# Patient Record
Sex: MALE | Race: WHITE
[De-identification: names, ages, dates, MRNs, and addresses within clinical notes are randomized per-mention and may not be internally consistent; named-entity substitution may affect disease eponyms.]

---

## 2018-08-07 NOTE — CONS
CONSULTATION



REASON FOR CONSULTATION:

This is a consultation note for sleep apnea.



PRIMARY CARE PHYSICIAN:

Dr. Jessica Hester



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

pm.  He goes to bed around midnight and he wakes up 10 o'clock in the morning.  He

snores. He was been told to stop breathing.  He lives with his brother and he has been

noted to stop breathing on multiple occasions.  His aunt has obstructive sleep apnea

and she has noted his breathing patterns during sleep and the patient was referred to

me for sleep apnea evaluation especially that he is having excessive fatigue and

tiredness and sleepiness during the day.  No falling asleep while driving.  Bagdad

score is at 5.  Weight stays stable.  He is excessively tired and sleepy during the

day.  No history of alcohol.  No history of substance abuse.  He has undergone

tonsillectomy in the past.  He has history of depression, maintained on Prozac.  No

other comorbidities otherwise.



PAST MEDICAL HISTORY:

Obesity and depression.



PAST SURGICAL HISTORY:

Tonsillectomy.



DRUG ALLERGIES:

Not known.



OUTPATIENT MEDICATIONS:

Prozac.



SOCIAL HISTORY:

He is a nonsmoker.  No history of alcohol.  No history of IV drugs.  He does not drink

coffee other than 2 cups a day.  No history of any alcohol or illicit substance abuse.

No history of intake of caffeinated beverages or any other energy drinks.



FAMILY HISTORY:

Aunt has obstructive sleep apnea.



REVIEW OF SYSTEMS:

12-point review of system was done.  Positive findings are mentioned in history of

present illness.  No sleepwalking or sleep talking.  No cataplexy.  No sleep paralysis.

No hallucinations.  No active anxiety.  No palpitations.  No anxiety.  No panic

attacks.  No heartburn during sleep.  No restlessness in lower extremities.  No sleep

talking. He has been told to wake up gasping for air.  Although he himself has not

noted that.



PHYSICAL EXAMINATION:

BP is 145/79, pulse 79, respirations 16, temperature 16, temp 97.7, weight is 259,

height is 5 feet 8 inches, Bagdad score is 5.  BMI 39.3.  Neck size 17-3/4 of an inch.

Saturation 97% on room air.  BP is 145/79.  General appearance: Calm, comfortable.

Head is atraumatic, normocephalic.  Neck Mallampati class IV.  There is no goiter or

neck masses.

LUNGS:  Clear to auscultation.

HEART:  Sounds regular rate and rhythm.  Normal S1, S2.  No S3.  No S4, no murmurs.

ABDOMEN:  Soft, nontender.

EXTREMITIES:  No edema.  No cyanosis or clubbing.

NEUROLOGIC:  Alert and oriented x3.  No focal neurological deficits.

PSYCHIATRIC:  Negative for anxiety or depression.



IMPRESSION:

1. Excessive hypersomnia and sleepiness with symptoms of snoring and witnessed apneas.

    Rule out underlying obstructive sleep apnea.

2. Obese with a BMI of 39.3.

3. Previous tonsillectomy.

4. History of depression.



PLAN:

1. Encourage weight loss.

2. Proceed with a screening polysomnogram looking for any significant obstructive

    sleep apnea.  There is a high suspicion that the patient may have an underlying

    sleep breathing disorder.





CESAR / ONIN: [id number] / Job#: [id number]

## 2019-01-15 NOTE — PN
PROGRESS NOTE



Samuel is a 23, coming to see me for a compliance check.  The patient was diagnosed

having mild obstructive sleep apnea and currently is on CPAP.  In APAP mode minimum of

5, maximum of 20.  He has a mild KIRK with an AHI of 11.4, worse during REM.  He is

benefitting considerably while on CPAP.  He is able to work, being much more alert and

awake and does not have to fall asleep while working or performing routine day-to-day

activities.  He has been very compliant with CPAP.  He is wearing every night.  He is

averaging 6.8 hours and his leak factor is 12 L/minute.  The average pressure is around

10.6 while on APAP therapy and his AHI is down to 1.5.  Weight is up by around 4-5

pounds.  No snoring and his snoring has completely subside as the patient is using

AirFit F20 medium-sized full-face mask.



REVIEW OF SYSTEMS:

12-point review of system was done.  Positive findings are mentioned in history of

present illness.



PHYSICAL EXAMINATION:

VITAL SIGNS: His current vitals, pressure is 149/76, pulse 78, respirations 16,

temperature 98.1, weight is 264.  Saturation 97% on room air.  Upton score is at 3.

GENERAL APPEARANCE:  Calm and comfortable.  Head is atraumatic, normocephalic.  Neck is

short and supple.  Crowding of posterior pharynx.  No goiter or neck masses.  Lungs

diminished, otherwise clear.

HEART:  Sounds regular rate and rhythm.  Normal S1, S2.  No S3.  No murmurs.

ABDOMEN:  Soft, nontender.  No organomegaly.

EXTREMITIES:  No edema.  No cyanosis or clubbing.

NEUROLOGIC:  Alert and oriented x3.  No focal neurological deficits.

PSYCHIATRY: Negative for anxiety or depression.



IMPRESSION:

1. Obstructive sleep apnea,  mild AHI of 11.4, continues to be successfully treated

    with APAP.

2. Hypersomnia, recovered.

3. Obesity with a BMI of 39.7.

4. Previous tonsillectomy.

5. History of depression.



PLAN:

1. Continue APAP.  Same pressure.

2. Continue the same mask which is an AirFit F20 full face mask.

3. Encourage weight loss.

4. Implement good sleep hygiene measures.

5. We will continue to follow.





MMODL / IJN: [id number] / Job#: [id number]

## 2022-08-02 ENCOUNTER — HOSPITAL ENCOUNTER (OUTPATIENT)
Dept: HOSPITAL 47 - LABWHC1 | Age: 27
Discharge: HOME | End: 2022-08-02
Payer: COMMERCIAL

## 2022-08-02 DIAGNOSIS — Z11.59: Primary | ICD-10-CM

## 2022-08-02 PROCEDURE — 87522 HEPATITIS C REVRS TRNSCRPJ: CPT

## 2022-08-02 PROCEDURE — 36415 COLL VENOUS BLD VENIPUNCTURE: CPT

## 2022-08-03 LAB
HCV RNA SERPL NAA+PROBE-LOG IU: <1.08 {LOG_IU}/ML (ref ?–1.08)
HCV RNA SERPL PROBE+SIG AMP-LOG IU: <12 IU/ML (ref ?–12)